# Patient Record
Sex: FEMALE | Race: ASIAN | NOT HISPANIC OR LATINO
[De-identification: names, ages, dates, MRNs, and addresses within clinical notes are randomized per-mention and may not be internally consistent; named-entity substitution may affect disease eponyms.]

---

## 2022-11-30 ENCOUNTER — NON-APPOINTMENT (OUTPATIENT)
Age: 32
End: 2022-11-30

## 2023-03-08 PROBLEM — Z00.00 ENCOUNTER FOR PREVENTIVE HEALTH EXAMINATION: Status: ACTIVE | Noted: 2023-03-08

## 2023-03-09 ENCOUNTER — APPOINTMENT (OUTPATIENT)
Dept: SPINE | Facility: CLINIC | Age: 33
End: 2023-03-09
Payer: COMMERCIAL

## 2023-03-09 ENCOUNTER — NON-APPOINTMENT (OUTPATIENT)
Age: 33
End: 2023-03-09

## 2023-03-09 VITALS
OXYGEN SATURATION: 96 % | WEIGHT: 105 LBS | HEART RATE: 66 BPM | HEIGHT: 61 IN | BODY MASS INDEX: 19.83 KG/M2 | SYSTOLIC BLOOD PRESSURE: 93 MMHG | DIASTOLIC BLOOD PRESSURE: 56 MMHG

## 2023-03-09 DIAGNOSIS — Z78.9 OTHER SPECIFIED HEALTH STATUS: ICD-10-CM

## 2023-03-09 DIAGNOSIS — Z87.728 PERSONAL HISTORY OF OTHER SPECIFIED (CORRECTED) CONGENITAL MALFORMATIONS OF NERVOUS SYSTEM AND SENSE ORGANS: ICD-10-CM

## 2023-03-09 DIAGNOSIS — Z80.0 FAMILY HISTORY OF MALIGNANT NEOPLASM OF DIGESTIVE ORGANS: ICD-10-CM

## 2023-03-09 PROCEDURE — 99203 OFFICE O/P NEW LOW 30 MIN: CPT

## 2023-03-09 NOTE — CONSULT LETTER
[Dear  ___] : Dear  [unfilled], [Courtesy Letter:] : I had the pleasure of seeing your patient, [unfilled], in my office today. [Please see my note below.] : Please see my note below. [Consult Closing:] : Thank you very much for allowing me to participate in the care of this patient.  If you have any questions, please do not hesitate to contact me. [Sincerely,] : Sincerely, [FreeTextEntry3] : Ladarius Peoples MD\par Chief of Neurosurgery [facility]\par [facility]\par

## 2023-03-09 NOTE — PHYSICAL EXAM
[Person] : oriented to person [Place] : oriented to place [Time] : oriented to time [Short Term Intact] : short term memory intact [Remote Intact] : remote memory intact [Span Intact] : the attention span was normal [Concentration Intact] : normal concentrating ability [Fluency] : fluency intact [Comprehension] : comprehension intact [Current Events] : adequate knowledge of current events [Past History] : adequate knowledge of personal past history [Vocabulary] : adequate range of vocabulary [Cranial Nerves Optic (II)] : visual acuity intact bilaterally,  pupils equal round and reactive to light [Cranial Nerves Oculomotor (III)] : extraocular motion intact [Cranial Nerves Trigeminal (V)] : facial sensation intact symmetrically [Cranial Nerves Facial (VII)] : face symmetrical [Cranial Nerves Vestibulocochlear (VIII)] : hearing was intact bilaterally [Cranial Nerves Glossopharyngeal (IX)] : tongue and palate midline [Cranial Nerves Accessory (XI - Cranial And Spinal)] : head turning and shoulder shrug symmetric [Cranial Nerves Hypoglossal (XII)] : there was no tongue deviation with protrusion [Motor Tone] : muscle tone was normal in all four extremities [Motor Strength] : muscle strength was normal in all four extremities [No Muscle Atrophy] : normal bulk in all four extremities [Sensation Tactile Decrease] : light touch was intact [Abnormal Walk] : normal gait [Balance] : balance was intact [2+] : Ankle jerk left 2+ [No Tenderness to Palpation] : no spine tenderness on palpation [Full ROM] : full ROM [No Pain with ROM] : no pain with motion in any direction [Able to toe walk] : the patient was able to toe walk [Able to heel walk] : the patient was able to heel walk [Past-pointing] : there was no past-pointing [Tremor] : no tremor present [Straight-Leg Raise Test - Left] : straight leg raise of the left leg was negative [Straight-Leg Raise Test - Right] : straight leg raise  of the right leg was negative [FreeTextEntry1] : Lumbar and lumbosacral scar well-healed.

## 2023-03-09 NOTE — HISTORY OF PRESENT ILLNESS
[> 3 months] : more  than 3 months [FreeTextEntry1] : Spina bifida occulta  [de-identified] : Ms. Cardoza is a 32 year old female who is 5 months pregnant with a history of chronic low back pain. The patient had an x-ray of lumbar spine done on 5/15/2021 which revealed spinal bifida occulta at L4 and L5. The patient the patient states she had lumbar surgery when she was born. The patient is here today to find out if she is a candidate to receive an epidural block during delivery. She is under the care of OBGYN Dr. John Smith. Denies any weakness, numbness or tingling of lower extremities, gait disturbance, bladder or bowel dysfunction.

## 2023-03-09 NOTE — REASON FOR VISIT
[New Patient Visit] : a new patient visit [Other: _____] : [unfilled] [FreeTextEntry1] : Spina bifida occulta

## 2023-03-09 NOTE — ASSESSMENT
[FreeTextEntry1] : 32 year old female with spina bifida occulta on x-ray and evidence on physical exam for repair of a lumbar sacral meningocele. MRI of lumbar spine without contrast ordered to evaluate spinal canal. The patient will return after obtaining image. \par \par I, Dr. Ladarius Peoples, evaluated this patient with Nurse Practitioner, Vianey Gayle, and established the plan of care. I personally discussed this patient during the key portions of the history and exam with the Nurse Practitioner at the time of the visit. I agree with the assessment and plan as written, unless noted below.

## 2023-03-15 ENCOUNTER — APPOINTMENT (OUTPATIENT)
Dept: MRI IMAGING | Facility: CLINIC | Age: 33
End: 2023-03-15
Payer: COMMERCIAL

## 2023-03-15 ENCOUNTER — TRANSCRIPTION ENCOUNTER (OUTPATIENT)
Age: 33
End: 2023-03-15

## 2023-03-15 ENCOUNTER — OUTPATIENT (OUTPATIENT)
Dept: OUTPATIENT SERVICES | Facility: HOSPITAL | Age: 33
LOS: 1 days | End: 2023-03-15
Payer: COMMERCIAL

## 2023-03-15 DIAGNOSIS — Q76.0 SPINA BIFIDA OCCULTA: ICD-10-CM

## 2023-03-15 PROCEDURE — 72148 MRI LUMBAR SPINE W/O DYE: CPT | Mod: 26

## 2023-03-15 PROCEDURE — 72148 MRI LUMBAR SPINE W/O DYE: CPT

## 2023-03-16 ENCOUNTER — APPOINTMENT (OUTPATIENT)
Dept: SPINE | Facility: CLINIC | Age: 33
End: 2023-03-16
Payer: COMMERCIAL

## 2023-03-16 VITALS
HEART RATE: 76 BPM | HEIGHT: 61 IN | BODY MASS INDEX: 19.83 KG/M2 | WEIGHT: 105 LBS | DIASTOLIC BLOOD PRESSURE: 73 MMHG | OXYGEN SATURATION: 95 % | SYSTOLIC BLOOD PRESSURE: 107 MMHG

## 2023-03-16 DIAGNOSIS — Q06.2: ICD-10-CM

## 2023-03-16 DIAGNOSIS — Q76.0 SPINA BIFIDA OCCULTA: ICD-10-CM

## 2023-03-16 DIAGNOSIS — Q06.8 OTHER SPECIFIED CONGENITAL MALFORMATIONS OF SPINAL CORD: ICD-10-CM

## 2023-03-16 DIAGNOSIS — Q05.9 SPINA BIFIDA, UNSPECIFIED: ICD-10-CM

## 2023-03-16 PROCEDURE — 99212 OFFICE O/P EST SF 10 MIN: CPT

## 2023-03-18 NOTE — ASSESSMENT
[FreeTextEntry1] : 32 year old female 5 months pregnant with status postrepair of  myelomeningocele.  She also has diastematomyelia and a tethered spinal cord.  Her open neural tube at birth and subsequent surgery likely has resulted in significant scarring that would make epidural anesthesia potentially problematic. The patient is not a candidate to receive an epidural during delivery. It was discussed with the patient she should avoid any spinal taps. Follow up as needed

## 2023-03-18 NOTE — REASON FOR VISIT
[Follow-Up: _____] : a [unfilled] follow-up visit [Spouse] : spouse [FreeTextEntry1] : Ms. Cardoza is a 32 year old female who is 5 months pregnant with a history of spinal bifida occulta at L4 and L5 and chronic low back pain.  On physical examination during initial visit on 3/9/2023 there is evidence of repair of a lumbar sacral meningocele. The patient was referred by her OBGYN Dr. John Smith to determine if she is a candidate to receive an epidural block during delivery.  Denies any weakness, numbness or tingling of lower extremities, gait disturbance, bladder or bowel dysfunction. \par \par MRI of lumbar spine reviewed today shows diastematomyelia along with a tethered spinal cord.  There is spinal cord down well into the lower lumbar area.  The spinal cord should normally terminate at the L1-2 level.\par

## 2023-07-27 ENCOUNTER — TRANSCRIPTION ENCOUNTER (OUTPATIENT)
Age: 33
End: 2023-07-27

## 2023-07-27 ENCOUNTER — INPATIENT (INPATIENT)
Facility: HOSPITAL | Age: 33
LOS: 3 days | Discharge: HOME | End: 2023-07-31
Attending: OBSTETRICS & GYNECOLOGY | Admitting: OBSTETRICS & GYNECOLOGY
Payer: COMMERCIAL

## 2023-07-27 VITALS — OXYGEN SATURATION: 98 % | HEART RATE: 64 BPM

## 2023-07-27 DIAGNOSIS — Z34.80 ENCOUNTER FOR SUPERVISION OF OTHER NORMAL PREGNANCY, UNSPECIFIED TRIMESTER: ICD-10-CM

## 2023-07-27 DIAGNOSIS — Z3A.40 40 WEEKS GESTATION OF PREGNANCY: ICD-10-CM

## 2023-07-27 DIAGNOSIS — O26.899 OTHER SPECIFIED PREGNANCY RELATED CONDITIONS, UNSPECIFIED TRIMESTER: ICD-10-CM

## 2023-07-27 LAB
BASOPHILS # BLD AUTO: 0.03 K/UL — SIGNIFICANT CHANGE UP (ref 0–0.2)
BASOPHILS NFR BLD AUTO: 0.3 % — SIGNIFICANT CHANGE UP (ref 0–2)
BLD GP AB SCN SERPL QL: NEGATIVE — SIGNIFICANT CHANGE UP
EOSINOPHIL # BLD AUTO: 0.08 K/UL — SIGNIFICANT CHANGE UP (ref 0–0.5)
EOSINOPHIL NFR BLD AUTO: 0.7 % — SIGNIFICANT CHANGE UP (ref 0–6)
HCT VFR BLD CALC: 32.8 % — LOW (ref 34.5–45)
HGB BLD-MCNC: 10.6 G/DL — LOW (ref 11.5–15.5)
IMM GRANULOCYTES NFR BLD AUTO: 0.5 % — SIGNIFICANT CHANGE UP (ref 0–0.9)
LYMPHOCYTES # BLD AUTO: 2.74 K/UL — SIGNIFICANT CHANGE UP (ref 1–3.3)
LYMPHOCYTES # BLD AUTO: 23.3 % — SIGNIFICANT CHANGE UP (ref 13–44)
MCHC RBC-ENTMCNC: 27.3 PG — SIGNIFICANT CHANGE UP (ref 27–34)
MCHC RBC-ENTMCNC: 32.3 GM/DL — SIGNIFICANT CHANGE UP (ref 32–36)
MCV RBC AUTO: 84.5 FL — SIGNIFICANT CHANGE UP (ref 80–100)
MONOCYTES # BLD AUTO: 0.86 K/UL — SIGNIFICANT CHANGE UP (ref 0–0.9)
MONOCYTES NFR BLD AUTO: 7.3 % — SIGNIFICANT CHANGE UP (ref 2–14)
NEUTROPHILS # BLD AUTO: 7.97 K/UL — HIGH (ref 1.8–7.4)
NEUTROPHILS NFR BLD AUTO: 67.9 % — SIGNIFICANT CHANGE UP (ref 43–77)
NRBC # BLD: 0 /100 WBCS — SIGNIFICANT CHANGE UP (ref 0–0)
PLATELET # BLD AUTO: 161 K/UL — SIGNIFICANT CHANGE UP (ref 150–400)
RBC # BLD: 3.88 M/UL — SIGNIFICANT CHANGE UP (ref 3.8–5.2)
RBC # FLD: 14.2 % — SIGNIFICANT CHANGE UP (ref 10.3–14.5)
RH IG SCN BLD-IMP: POSITIVE — SIGNIFICANT CHANGE UP
RH IG SCN BLD-IMP: POSITIVE — SIGNIFICANT CHANGE UP
WBC # BLD: 11.74 K/UL — HIGH (ref 3.8–10.5)
WBC # FLD AUTO: 11.74 K/UL — HIGH (ref 3.8–10.5)

## 2023-07-27 RX ORDER — CHLORHEXIDINE GLUCONATE 213 G/1000ML
1 SOLUTION TOPICAL DAILY
Refills: 0 | Status: DISCONTINUED | OUTPATIENT
Start: 2023-07-27 | End: 2023-07-28

## 2023-07-27 RX ORDER — OXYTOCIN 10 UNIT/ML
333.33 VIAL (ML) INJECTION
Qty: 20 | Refills: 0 | Status: DISCONTINUED | OUTPATIENT
Start: 2023-07-27 | End: 2023-07-31

## 2023-07-27 RX ORDER — CITRIC ACID/SODIUM CITRATE 300-500 MG
15 SOLUTION, ORAL ORAL EVERY 6 HOURS
Refills: 0 | Status: DISCONTINUED | OUTPATIENT
Start: 2023-07-27 | End: 2023-07-28

## 2023-07-27 RX ORDER — SODIUM CHLORIDE 9 MG/ML
1000 INJECTION, SOLUTION INTRAVENOUS
Refills: 0 | Status: DISCONTINUED | OUTPATIENT
Start: 2023-07-27 | End: 2023-07-28

## 2023-07-27 NOTE — OB RN PATIENT PROFILE - HEALTH/HEALTHCARE ANXIETIES, PROFILE
Father states mother wants to take patient home to attempt to collect urine because pt is fussy. Explained that urine collection must be done here. Reviewed the risks of patient having a undiagnosed urinary tract infection to include kidney damage and even possibly death. Father states he is ok with a cath urine collection but mother is still saying no. Explained informed refusal form will need to be signed if unable to collect urine during visit. Father verbalized understanding. Updated Dr. Emi Salinas on conversations, states he will go back and speak to mother and father again. denies

## 2023-07-27 NOTE — OB RN PATIENT PROFILE - TEACHING/LEARNING FACTORS IMPACT ABILITY TO LEARN
S:Patient presents due to  edema and fetal arrhythmia heard in clinic by Dr. Smallwood.    B:38w6d   Allergies: Augmentin and Avocado  A:              Admission on 2018   Component Date Value     ALT 2018 9      AST 2018 14      Sodium 2018 139      Potassium 2018 3.8      Chloride 2018 106      Carbon Dioxide 2018 24      Anion Gap 2018 9      Glucose 2018 95      Urea Nitrogen 2018 8      Creatinine 2018 0.69      GFR Estimate 2018 >90      GFR Estimate If Black 2018 >90      Calcium 2018 8.4*     WBC 2018 7.4      RBC Count 2018 3.60*     Hemoglobin 2018 11.1*     Hematocrit 2018 33.5*     MCV 2018 93      MCH 2018 30.8      MCHC 2018 33.1      RDW 2018 12.1      Platelet Count 2018 156      Dr. JESUS Smallwood and was informed to patient status. and orders received.  Plan includes; Labs  and NST. Reviewed with patient and she agrees with plan.   Bill of Rights given & questions discussed?: Yes  HC Your Rights handout : Informed and given    Prenatal Breastfeeding Education Toolkit provided for patient to review,helping her to make an informed decision on a feeding choice for her baby. Patient declined. Questions answered.    Oriented to room and call light.            none

## 2023-07-27 NOTE — OB RN PATIENT PROFILE - PATIENT'S PREFERRED PRONOUN
Jaron Reed,    This note is let you know that all of your lab test results look great.  Let me know if you have any questions.    Lisa TILLMAN CNP  
Her/She

## 2023-07-27 NOTE — OB RN PATIENT PROFILE - FUNCTIONAL ASSESSMENT - DAILY ACTIVITY SCORE HIDDEN
No new care gaps identified.  [facility] Embedded Care Gaps. Reference number: [id number]. 2/17/2023   1:32:24 PM CST   24

## 2023-07-27 NOTE — OB PROVIDER H&P - ASSESSMENT
A/P: 32yoF  @40.5 weeks gestation (MIRANDA 23) admitted for labor. Pt was consulted by anesthesia 2/2 h/o spinal bifida.   - Admit to L&D  - Routine Labs. IVF.   - EFM/[place]: continuous monitoring   - Expectant management   - GBS negative   - Elevated PPH risk 2/2   - Anesthesia consulted prior to delivery -> *pt medically denied clearance for epidural/spinal pain mgmt 2/2 h/o spinal bifida. Anesthesia made aware of pt's admission*   - D/w CRISTHIAN MylesC

## 2023-07-27 NOTE — OB PROVIDER H&P - HISTORY OF PRESENT ILLNESS
OB PA Admission H&P    32yoF  @40.5 weeks gestation (MIRANDA 23) presents for ROL. Pt c/o episode of vaginal bleeding this afternoon along with painful contractions occurring regularly every 2-4 minutes, c/o increase pressure like pain. Denies any leakage of fluid. Endorses +FM. PNC uncomplicated. GBS negative. Pt denies any other concerns. Pt last ate 4pm.     Pt with significant PMHx of spinal bifida occulta s/p repair of  myelomeningocele. Per NeuroSx note- pt also has diastematomyelia and a tethered spinal cord. Her open neural tube at birth and subsequent surgery likely has resulted in significant scarring that would make epidural anesthesia potentially problematic. The patient is not a candidate to receive an epidural during delivery. It was discussed with the patient she should avoid any spinal taps.   Anesthesia made aware of patient's admission.      – PNC: GBS(-). EFW 3500g.   – OBHx: primigravida   – GynHx: denies h/o fibroids, ovarian cysts, abnl pap smears, STDs  – PMH: h/o spinal bifida occulta at L4 and L5 and chronic low back pain (see above neurosx note); denies h/o HTN, DM, asthma, thyroid disorders, bleeding disorders, h/o blood transfusions   – PSH: h/o spinal sx   – Psych: denies anxiety/depression   – Social: denies alcohol/tobacco/drug use in pregnancy   – Meds: PNV   – Allergies: NKDA  – Will accept blood transfusions: Yes    Vital Signs Last 24 Hrs  T(C): 36.7 (2023 18:35), Max: 36.7 (2023 18:18)  T(F): 98.1 (2023 18:35), Max: 98.1 (2023 18:26)  HR: 77 (2023 19:27) (59 - 77)  BP: 113/59 (2023 18:35) (113/59 - 113/59)  RR: 18 (2023 18:35) (18 - 18)  SpO2: 98% (2023 19:27) (87% - 99%)    Gen: NAD  CV: RRR  Lungs: CTA b/l   Abd: gravid, non-tender  Ext: BLE non-edematous, no calf tenderness b/l     – VE:   – FHT: baseline 150, mod variability, +accels, -decels  – Erwin: q2-3min   – EFW: 3500g   – Sono: vertex

## 2023-07-27 NOTE — OB PROVIDER H&P - ATTENDING COMMENTS
P0 presented in early labor.   - PMHx significant for spina bifida. Pt had anesthesia consult and can NOT receive epidural   - will expectantly manage labor.   - 5.5/90/0

## 2023-07-27 NOTE — OB PROVIDER H&P - NSLOWPPHRISK_OBGYN_A_OB
No previous uterine incision/Vigil Pregnancy/No known bleeding disorder/No other PPH risks indicated

## 2023-07-27 NOTE — OB RN PATIENT PROFILE - FALL HARM RISK - UNIVERSAL INTERVENTIONS
Bed in lowest position, wheels locked, appropriate side rails in place/Call bell, personal items and telephone in reach/Instruct patient to call for assistance before getting out of bed or chair/Non-slip footwear when patient is out of bed/[place] to call system/Physically safe environment - no spills, clutter or unnecessary equipment/Purposeful Proactive Rounding/Room/bathroom lighting operational, light cord in reach

## 2023-07-27 NOTE — OB RN PATIENT PROFILE - PATIENT ON (OXYGEN DELIVERY METHOD)
Pt arrived to ED via wheelchair for the cath lab. Pt states he had his dialysis cath replaced this morning and he was sent the ED for dialysis due to SOB and high potassium.
stand-by assist
room air

## 2023-07-27 NOTE — OB PROVIDER H&P - LABOR: BISHOP SCORE
Spoke with patients son, Adam, and informed him of the below results and recommendations. He verbalized understanding.         11

## 2023-07-28 LAB — T PALLIDUM AB TITR SER: NEGATIVE — SIGNIFICANT CHANGE UP

## 2023-07-28 PROCEDURE — 59514 CESAREAN DELIVERY ONLY: CPT | Mod: AS,U9

## 2023-07-28 DEVICE — SURGICEL FIBRILLAR 2 X 4": Type: IMPLANTABLE DEVICE | Status: FUNCTIONAL

## 2023-07-28 RX ORDER — KETOROLAC TROMETHAMINE 30 MG/ML
30 SYRINGE (ML) INJECTION EVERY 6 HOURS
Refills: 0 | Status: DISCONTINUED | OUTPATIENT
Start: 2023-07-28 | End: 2023-07-30

## 2023-07-28 RX ORDER — DIPHENOXYLATE HCL/ATROPINE 2.5-.025MG
1 TABLET ORAL ONCE
Refills: 0 | Status: DISCONTINUED | OUTPATIENT
Start: 2023-07-28 | End: 2023-07-31

## 2023-07-28 RX ORDER — OXYTOCIN 10 UNIT/ML
2 VIAL (ML) INJECTION
Qty: 30 | Refills: 0 | Status: DISCONTINUED | OUTPATIENT
Start: 2023-07-28 | End: 2023-07-31

## 2023-07-28 RX ORDER — HEPARIN SODIUM 5000 [USP'U]/ML
5000 INJECTION INTRAVENOUS; SUBCUTANEOUS EVERY 12 HOURS
Refills: 0 | Status: DISCONTINUED | OUTPATIENT
Start: 2023-07-28 | End: 2023-07-31

## 2023-07-28 RX ORDER — NALOXONE HYDROCHLORIDE 4 MG/.1ML
0.1 SPRAY NASAL
Refills: 0 | Status: DISCONTINUED | OUTPATIENT
Start: 2023-07-28 | End: 2023-07-29

## 2023-07-28 RX ORDER — DIPHENHYDRAMINE HCL 50 MG
25 CAPSULE ORAL EVERY 6 HOURS
Refills: 0 | Status: DISCONTINUED | OUTPATIENT
Start: 2023-07-28 | End: 2023-07-31

## 2023-07-28 RX ORDER — HYDROMORPHONE HYDROCHLORIDE 2 MG/ML
0.5 INJECTION INTRAMUSCULAR; INTRAVENOUS; SUBCUTANEOUS
Refills: 0 | Status: DISCONTINUED | OUTPATIENT
Start: 2023-07-28 | End: 2023-07-29

## 2023-07-28 RX ORDER — MAGNESIUM HYDROXIDE 400 MG/1
30 TABLET, CHEWABLE ORAL
Refills: 0 | Status: DISCONTINUED | OUTPATIENT
Start: 2023-07-28 | End: 2023-07-31

## 2023-07-28 RX ORDER — DIPHENOXYLATE HCL/ATROPINE 2.5-.025MG
2 TABLET ORAL ONCE
Refills: 0 | Status: DISCONTINUED | OUTPATIENT
Start: 2023-07-28 | End: 2023-07-28

## 2023-07-28 RX ORDER — OXYCODONE HYDROCHLORIDE 5 MG/1
5 TABLET ORAL
Refills: 0 | Status: COMPLETED | OUTPATIENT
Start: 2023-07-28 | End: 2023-08-04

## 2023-07-28 RX ORDER — NALBUPHINE HYDROCHLORIDE 10 MG/ML
2.5 INJECTION, SOLUTION INTRAMUSCULAR; INTRAVENOUS; SUBCUTANEOUS EVERY 6 HOURS
Refills: 0 | Status: DISCONTINUED | OUTPATIENT
Start: 2023-07-28 | End: 2023-07-29

## 2023-07-28 RX ORDER — LANOLIN
1 OINTMENT (GRAM) TOPICAL EVERY 6 HOURS
Refills: 0 | Status: DISCONTINUED | OUTPATIENT
Start: 2023-07-28 | End: 2023-07-31

## 2023-07-28 RX ORDER — ACETAMINOPHEN 500 MG
975 TABLET ORAL
Refills: 0 | Status: DISCONTINUED | OUTPATIENT
Start: 2023-07-28 | End: 2023-07-31

## 2023-07-28 RX ORDER — TETANUS TOXOID, REDUCED DIPHTHERIA TOXOID AND ACELLULAR PERTUSSIS VACCINE, ADSORBED 5; 2.5; 8; 8; 2.5 [IU]/.5ML; [IU]/.5ML; UG/.5ML; UG/.5ML; UG/.5ML
0.5 SUSPENSION INTRAMUSCULAR ONCE
Refills: 0 | Status: DISCONTINUED | OUTPATIENT
Start: 2023-07-28 | End: 2023-07-31

## 2023-07-28 RX ORDER — SIMETHICONE 80 MG/1
80 TABLET, CHEWABLE ORAL EVERY 4 HOURS
Refills: 0 | Status: DISCONTINUED | OUTPATIENT
Start: 2023-07-28 | End: 2023-07-31

## 2023-07-28 RX ORDER — BUTORPHANOL TARTRATE 2 MG/ML
0.12 INJECTION, SOLUTION INTRAMUSCULAR; INTRAVENOUS EVERY 6 HOURS
Refills: 0 | Status: DISCONTINUED | OUTPATIENT
Start: 2023-07-28 | End: 2023-07-29

## 2023-07-28 RX ORDER — OXYTOCIN 10 UNIT/ML
333.33 VIAL (ML) INJECTION
Qty: 20 | Refills: 0 | Status: DISCONTINUED | OUTPATIENT
Start: 2023-07-28 | End: 2023-07-31

## 2023-07-28 RX ORDER — IBUPROFEN 200 MG
600 TABLET ORAL EVERY 6 HOURS
Refills: 0 | Status: COMPLETED | OUTPATIENT
Start: 2023-07-28 | End: 2024-06-25

## 2023-07-28 RX ORDER — SODIUM CHLORIDE 9 MG/ML
1000 INJECTION, SOLUTION INTRAVENOUS
Refills: 0 | Status: DISCONTINUED | OUTPATIENT
Start: 2023-07-28 | End: 2023-07-31

## 2023-07-28 RX ORDER — ONDANSETRON 8 MG/1
4 TABLET, FILM COATED ORAL EVERY 6 HOURS
Refills: 0 | Status: DISCONTINUED | OUTPATIENT
Start: 2023-07-28 | End: 2023-07-29

## 2023-07-28 RX ORDER — OXYCODONE HYDROCHLORIDE 5 MG/1
5 TABLET ORAL ONCE
Refills: 0 | Status: DISCONTINUED | OUTPATIENT
Start: 2023-07-28 | End: 2023-07-31

## 2023-07-28 RX ORDER — HYDROMORPHONE HYDROCHLORIDE 2 MG/ML
30 INJECTION INTRAMUSCULAR; INTRAVENOUS; SUBCUTANEOUS
Refills: 0 | Status: DISCONTINUED | OUTPATIENT
Start: 2023-07-28 | End: 2023-07-29

## 2023-07-28 RX ORDER — REMIFENTANIL HYDROCHLORIDE 5 MG/5ML
0.03 INJECTION, POWDER, LYOPHILIZED, FOR SOLUTION INTRAVENOUS
Qty: 10 | Refills: 0 | Status: DISCONTINUED | OUTPATIENT
Start: 2023-07-28 | End: 2023-07-31

## 2023-07-28 RX ADMIN — Medication 0.25 MILLIGRAM(S): at 06:02

## 2023-07-28 RX ADMIN — Medication 30 MILLIGRAM(S): at 18:30

## 2023-07-28 RX ADMIN — Medication 2 TABLET(S): at 13:03

## 2023-07-28 RX ADMIN — SODIUM CHLORIDE 125 MILLILITER(S): 9 INJECTION, SOLUTION INTRAVENOUS at 07:26

## 2023-07-28 RX ADMIN — Medication 2 MILLIUNIT(S)/MIN: at 06:30

## 2023-07-28 RX ADMIN — Medication 975 MILLIGRAM(S): at 22:44

## 2023-07-28 RX ADMIN — HYDROMORPHONE HYDROCHLORIDE 30 MILLILITER(S): 2 INJECTION INTRAMUSCULAR; INTRAVENOUS; SUBCUTANEOUS at 13:14

## 2023-07-28 RX ADMIN — Medication 975 MILLIGRAM(S): at 15:36

## 2023-07-28 RX ADMIN — Medication 30 MILLIGRAM(S): at 23:48

## 2023-07-28 RX ADMIN — Medication 30 MILLIGRAM(S): at 18:00

## 2023-07-28 RX ADMIN — Medication 975 MILLIGRAM(S): at 21:38

## 2023-07-28 RX ADMIN — HEPARIN SODIUM 5000 UNIT(S): 5000 INJECTION INTRAVENOUS; SUBCUTANEOUS at 21:38

## 2023-07-28 NOTE — OB PROVIDER LABOR PROGRESS NOTE - NS_SUBJECTIVE/OBJECTIVE_OBGYN_ALL_OB_FT
Labor & Delivery Progress Note     Pt examined @ 0600 due to prolonged deceleration of 4min in the setting of a prolonged contraction     T(C): 36.7 (07-27-23 @ 23:20), Max: 36.8 (07-27-23 @ 21:43)  HR: 63 (07-28-23 @ 06:10) (52 - 79)  BP: 116/56 (07-28-23 @ 06:10) (113/59 - 127/66)  RR: 14 (07-28-23 @ 06:00) (14 - 18)  SpO2: 99% (07-28-23 @ 06:08) (87% - 100%)

## 2023-07-28 NOTE — OB RN INTRAOPERATIVE NOTE - NS_URGENCYSURGERY_OBGYN_ALL_OB
Called patient and advised him of the message. He stated that he will call back to schedule an appointment.    Susana Worrell MA      Non-scheduled Non Urgent

## 2023-07-28 NOTE — CHART NOTE - NSCHARTNOTEFT_GEN_A_CORE
Pt re-examined. 8/100/0. AROM scantly bloody fluid. Pt in significant discomfort. All options of pain control discussed again. Continues to decline all pain intervention.  - Due to irregular contraction pattern, will consider low dose pitocin to speed along labor.

## 2023-07-28 NOTE — OB PROVIDER LABOR PROGRESS NOTE - ASSESSMENT
#Labor   - Given prolonged contraction, decision was made to administer Terbutaline  - Additionally, patient noted to have been recently given Fransisco/Fent at approximately 0540 (pt unable to get epi or a spinal given hx of spina bifida)  - Patient not started on Oxytocin at this time     Nicolas Bang, PGY-2    d/w Dr. PANCHO Smith 
A/P: 32yoF  @40.5 weeks gestation (MIRANDA 23) admitted for labor. Pt consulted by anesthesia 2/2 h/o spinal bifida.   - Expt mgmt    - EFM/[place]: continuous monitoring   - GBS negative   - Anesthesia consulted   - Resuscitative measures prn   - Peanut ball    - D/w Dr. Luis Unger, PA-C

## 2023-07-28 NOTE — CHART NOTE - NSCHARTNOTEFT_GEN_A_CORE
Pt fully dilated and pushing. No descent of vertex past pubis. Presenting now as asyclitic OT.   - all considerations discussed with patient and . As there is no descent, pt in significant pain. Would rec proceed with  delivery rather that continued pusing.

## 2023-07-28 NOTE — OB PROVIDER DELIVERY SUMMARY - NSLOWPPHRISK_OBGYN_A_OB
No previous uterine incision/Vigil Pregnancy/Less than or equal to 4 previous vaginal births/No known bleeding disorder/No history of postpartum hemorrhage/No other PPH risks indicated

## 2023-07-28 NOTE — OB NEONATOLOGY/PEDIATRICIAN DELIVERY SUMMARY - NSPEDSNEONOTESA_OBGYN_ALL_OB_FT
Jerry requested to attend primary  under general anesthesia by OB. Infant is a 40.6 week M born to a 33 yo A+, PNL negative and immune, GBS negative mother. AROM, bloody,  at 04:04, about 7 hrs ptd. Pregnancy uncomplicated. Maternal history significant for spina bifida, unable to receive epidural. Infant born vigorous with spontaneous cry. Routine resuscitation. Apgars 9/9. PE unremarkable. Transfer to [facility] for routine care under management of PMD. EOS 0.1.

## 2023-07-28 NOTE — OB RN DELIVERY SUMMARY - NSSELHIDDEN_OBGYN_ALL_OB_FT
[NS_DeliveryAttending1_OBGYN_ALL_OB_FT:[id number]==],[NS_DeliveryAssist1_OBGYN_ALL_OB_FT:[id number]==],[NS_DeliveryRN_OBGYN_ALL_OB_FT:[id number]==]

## 2023-07-28 NOTE — OB PROVIDER LABOR PROGRESS NOTE - NS_SUBJECTIVE/OBJECTIVE_OBGYN_ALL_OB_FT
Pt seen and examined at bedside for cervical change.  Pt appears more uncomfortable and c/o increase pelvic pressure.   VE: 7/90/1, bloody show     Vital Signs Last 24 Hrs  T(C): 36.7 (27 Jul 2023 23:20), Max: 36.8 (27 Jul 2023 21:43)  T(F): 98.06 (27 Jul 2023 23:20), Max: 98.24 (27 Jul 2023 21:43)  HR: 66 (28 Jul 2023 00:08) (55 - 77)  BP: 118/63 (27 Jul 2023 23:20) (113/59 - 118/63)  RR: 17 (27 Jul 2023 21:43) (17 - 18)  SpO2: 98% (28 Jul 2023 00:08) (87% - 100%)

## 2023-07-28 NOTE — OB PROVIDER DELIVERY SUMMARY - NSPROVIDERDELIVERYNOTE_OBGYN_ALL_OB_FT
PLTCS performed for arrest of descent and cephalopelvic disproportion. Pt underwent general anesthesia due to history of spina bifida. Atraumatic delivery of viable male infant in LOT position w/ apgars 9/9 in clear amniotic fluid. Manual removal of intact placenta. Uterus boggy, exteriorized and closed with PDS suture. IM methergine, Hemabate and Pitocin administered. Tone slightly improved. Rectal cytotec administered and with continued massage, tone improved. Excellent hemostasis of hysterotomy. Grossly normal uterus, tubes and ovaries b/l. Reyes was draining imelda blood at start of case, clear and slightly pink tinged at conclusion.   QBL 720cc  UO 250cc  IVF 1500cc.     Dict#: [id number]

## 2023-07-28 NOTE — CHART NOTE - NSCHARTNOTEFT_GEN_A_CORE
Pt has made slow and inadequate change through the night. Has had mild irregular contraction pattern despite being in significant pain.   - all considerations discussed at length with  and patient. Pt wishes to exhaust all measures to have a vaginal delivery.   - Will augment with pitocin and observe.

## 2023-07-28 NOTE — OB RN INTRAOPERATIVE NOTE - NSSELHIDDEN_OBGYN_ALL_OB_FT
[NS_DeliveryAttending1_OBGYN_ALL_OB_FT:[id number]==],[NS_DeliveryAssist1_OBGYN_ALL_OB_FT:[id number]==],[NS_DeliveryRN_OBGYN_ALL_OB_FT:[id number]]

## 2023-07-28 NOTE — OB PROVIDER DELIVERY SUMMARY - NSSELHIDDEN_OBGYN_ALL_OB_FT
[NS_DeliveryAttending1_OBGYN_ALL_OB_FT:[id number]==],[NS_DeliveryAssist1_OBGYN_ALL_OB_FT:[id number]==],[NS_DeliveryRN_OBGYN_ALL_OB_FT:[id number]==]

## 2023-07-29 LAB
BASOPHILS # BLD AUTO: 0 K/UL — SIGNIFICANT CHANGE UP (ref 0–0.2)
BASOPHILS NFR BLD AUTO: 0 % — SIGNIFICANT CHANGE UP (ref 0–2)
EOSINOPHIL # BLD AUTO: 0 K/UL — SIGNIFICANT CHANGE UP (ref 0–0.5)
EOSINOPHIL NFR BLD AUTO: 0 % — SIGNIFICANT CHANGE UP (ref 0–6)
HCT VFR BLD CALC: 20 % — CRITICAL LOW (ref 34.5–45)
HCT VFR BLD CALC: 24.1 % — LOW (ref 34.5–45)
HGB BLD-MCNC: 6.6 G/DL — CRITICAL LOW (ref 11.5–15.5)
HGB BLD-MCNC: 7.8 G/DL — LOW (ref 11.5–15.5)
LYMPHOCYTES # BLD AUTO: 1.65 K/UL — SIGNIFICANT CHANGE UP (ref 1–3.3)
LYMPHOCYTES # BLD AUTO: 10.4 % — LOW (ref 13–44)
MANUAL SMEAR VERIFICATION: SIGNIFICANT CHANGE UP
MCHC RBC-ENTMCNC: 27.4 PG — SIGNIFICANT CHANGE UP (ref 27–34)
MCHC RBC-ENTMCNC: 27.7 PG — SIGNIFICANT CHANGE UP (ref 27–34)
MCHC RBC-ENTMCNC: 32.4 GM/DL — SIGNIFICANT CHANGE UP (ref 32–36)
MCHC RBC-ENTMCNC: 33 GM/DL — SIGNIFICANT CHANGE UP (ref 32–36)
MCV RBC AUTO: 84 FL — SIGNIFICANT CHANGE UP (ref 80–100)
MCV RBC AUTO: 84.6 FL — SIGNIFICANT CHANGE UP (ref 80–100)
MONOCYTES # BLD AUTO: 0.97 K/UL — HIGH (ref 0–0.9)
MONOCYTES NFR BLD AUTO: 6.1 % — SIGNIFICANT CHANGE UP (ref 2–14)
NEUTROPHILS # BLD AUTO: 13.24 K/UL — HIGH (ref 1.8–7.4)
NEUTROPHILS NFR BLD AUTO: 82.6 % — HIGH (ref 43–77)
NEUTS BAND # BLD: 0.9 % — SIGNIFICANT CHANGE UP (ref 0–8)
NRBC # BLD: 0 /100 WBCS — SIGNIFICANT CHANGE UP (ref 0–0)
PLAT MORPH BLD: NORMAL — SIGNIFICANT CHANGE UP
PLATELET # BLD AUTO: 121 K/UL — LOW (ref 150–400)
PLATELET # BLD AUTO: 153 K/UL — SIGNIFICANT CHANGE UP (ref 150–400)
RBC # BLD: 2.38 M/UL — LOW (ref 3.8–5.2)
RBC # BLD: 2.85 M/UL — LOW (ref 3.8–5.2)
RBC # FLD: 14.6 % — HIGH (ref 10.3–14.5)
RBC # FLD: 14.6 % — HIGH (ref 10.3–14.5)
RBC BLD AUTO: SIGNIFICANT CHANGE UP
WBC # BLD: 15.86 K/UL — HIGH (ref 3.8–10.5)
WBC # BLD: 22.15 K/UL — HIGH (ref 3.8–10.5)
WBC # FLD AUTO: 15.86 K/UL — HIGH (ref 3.8–10.5)
WBC # FLD AUTO: 22.15 K/UL — HIGH (ref 3.8–10.5)

## 2023-07-29 RX ORDER — OXYCODONE HYDROCHLORIDE 5 MG/1
5 TABLET ORAL
Refills: 0 | Status: DISCONTINUED | OUTPATIENT
Start: 2023-07-29 | End: 2023-07-31

## 2023-07-29 RX ORDER — IBUPROFEN 200 MG
600 TABLET ORAL EVERY 6 HOURS
Refills: 0 | Status: DISCONTINUED | OUTPATIENT
Start: 2023-07-29 | End: 2023-07-31

## 2023-07-29 RX ADMIN — Medication 30 MILLIGRAM(S): at 12:47

## 2023-07-29 RX ADMIN — Medication 30 MILLIGRAM(S): at 05:20

## 2023-07-29 RX ADMIN — Medication 975 MILLIGRAM(S): at 04:13

## 2023-07-29 RX ADMIN — Medication 30 MILLIGRAM(S): at 23:30

## 2023-07-29 RX ADMIN — Medication 975 MILLIGRAM(S): at 21:15

## 2023-07-29 RX ADMIN — Medication 975 MILLIGRAM(S): at 10:40

## 2023-07-29 RX ADMIN — Medication 975 MILLIGRAM(S): at 16:06

## 2023-07-29 RX ADMIN — HEPARIN SODIUM 5000 UNIT(S): 5000 INJECTION INTRAVENOUS; SUBCUTANEOUS at 09:49

## 2023-07-29 RX ADMIN — Medication 30 MILLIGRAM(S): at 13:15

## 2023-07-29 RX ADMIN — Medication 30 MILLIGRAM(S): at 18:54

## 2023-07-29 RX ADMIN — Medication 975 MILLIGRAM(S): at 17:00

## 2023-07-29 RX ADMIN — HYDROMORPHONE HYDROCHLORIDE 30 MILLILITER(S): 2 INJECTION INTRAMUSCULAR; INTRAVENOUS; SUBCUTANEOUS at 07:15

## 2023-07-29 RX ADMIN — HEPARIN SODIUM 5000 UNIT(S): 5000 INJECTION INTRAVENOUS; SUBCUTANEOUS at 21:15

## 2023-07-29 RX ADMIN — SODIUM CHLORIDE 125 MILLILITER(S): 9 INJECTION, SOLUTION INTRAVENOUS at 09:47

## 2023-07-29 RX ADMIN — Medication 30 MILLIGRAM(S): at 06:13

## 2023-07-29 RX ADMIN — Medication 975 MILLIGRAM(S): at 22:15

## 2023-07-29 RX ADMIN — Medication 30 MILLIGRAM(S): at 00:01

## 2023-07-29 RX ADMIN — Medication 975 MILLIGRAM(S): at 09:47

## 2023-07-29 RX ADMIN — Medication 975 MILLIGRAM(S): at 03:29

## 2023-07-29 NOTE — PROVIDER CONTACT NOTE (OTHER) - BACKGROUND
Eric d/c'd on previous shift, DTV [id number] hrs.  Attempted to void, requested more time to void on own.

## 2023-07-29 NOTE — PROGRESS NOTE ADULT - SUBJECTIVE AND OBJECTIVE BOX
Day 1 of Anesthesia Pain Management Service    SUBJECTIVE: Patient is doing well with IV PCA    Pain Scale Score:	[X] Refer to charted pain scores    THERAPY:    [ ] IV PCA Morphine		[ ] 5 mg/mL	[ ] 1 mg/mL  [X] IV PCA Hydromorphone	[ ] 5 mg/mL	[X] 1 mg/mL  [ ] IV PCA Fentanyl		[ ] 50 micrograms/mL    Demand dose: 0.2 mg     Lockout: 6 minutes   Continuous Rate: 0 mg/hr  4 Hour Limit: 4 mg    MEDICATIONS  (STANDING):  acetaminophen     Tablet .. 975 milliGRAM(s) Oral <User Schedule>  diphenoxylate/atropine 1 Tablet(s) Oral once  diphtheria/tetanus/pertussis (acellular) Vaccine (Adacel) 0.5 milliLiter(s) IntraMuscular once  heparin   Injectable 5000 Unit(s) SubCutaneous every 12 hours  HYDROmorphone PCA (1 mG/mL) 30 milliLiter(s) PCA Continuous PCA Continuous  ibuprofen  Tablet. 600 milliGRAM(s) Oral every 6 hours  ketorolac   Injectable 30 milliGRAM(s) IV Push every 6 hours  lactated ringers. 1000 milliLiter(s) (125 mL/Hr) IV Continuous <Continuous>  oxytocin Infusion 333.333 milliUNIT(s)/Min (1000 mL/Hr) IV Continuous <Continuous>  oxytocin Infusion 333.333 milliUNIT(s)/Min (1000 mL/Hr) IV Continuous <Continuous>  oxytocin Infusion. 2 milliUNIT(s)/Min (2 mL/Hr) IV Continuous <Continuous>  remifentanil Infusion 0.025 MICROgram(s)/kG/Min (2.16 mL/Hr) IV Continuous <Continuous>    MEDICATIONS  (PRN):  butorphanol Injectable 0.125 milliGRAM(s) IV Push every 6 hours PRN Pruritus  diphenhydrAMINE 25 milliGRAM(s) Oral every 6 hours PRN Pruritus  HYDROmorphone PCA (1 mG/mL) Rescue Clinician Bolus 0.5 milliGRAM(s) IV Push every 15 minutes PRN for Pain Scale GREATER THAN 6  lanolin Ointment 1 Application(s) Topical every 6 hours PRN Sore Nipples  magnesium hydroxide Suspension 30 milliLiter(s) Oral two times a day PRN Constipation  nalbuphine Injectable 2.5 milliGRAM(s) IV Push every 6 hours PRN Pruritus  naloxone Injectable 0.1 milliGRAM(s) IV Push every 3 minutes PRN For ANY of the following changes in patient status:  A. RR LESS THAN 10 breaths per minute, B. Oxygen saturation LESS THAN 90%, C. Sedation score of 6  ondansetron Injectable 4 milliGRAM(s) IV Push every 6 hours PRN Nausea  oxyCODONE    IR 5 milliGRAM(s) Oral every 3 hours PRN Moderate to Severe Pain (4-10)  oxyCODONE    IR 5 milliGRAM(s) Oral once PRN Moderate to Severe Pain (4-10)  simethicone 80 milliGRAM(s) Chew every 4 hours PRN Gas      OBJECTIVE:    Sedation Score:	[ X] Alert	[ ] Drowsy 	[ ] Arousable	[ ] Asleep	[ ] Unresponsive    Side Effects:	[X ] None	[ ] Nausea	[ ] Vomiting	[ ] Pruritus  		[ ] Other:    Vital Signs Last 24 Hrs  T(C): 36.6 (29 Jul 2023 08:56), Max: 37.3 (28 Jul 2023 21:00)  T(F): 97.9 (29 Jul 2023 08:56), Max: 99.1 (28 Jul 2023 21:00)  HR: 64 (29 Jul 2023 08:56) (64 - 108)  BP: 95/58 (29 Jul 2023 08:56) (94/60 - 149/75)  BP(mean): 92 (28 Jul 2023 12:30) (92 - 92)  RR: 18 (29 Jul 2023 08:56) (16 - 20)  SpO2: 96% (29 Jul 2023 08:56) (95% - 100%)    Parameters below as of 29 Jul 2023 08:56  Patient On (Oxygen Delivery Method): room air        ASSESSMENT/ PLAN    Therapy to  be:               [X] Continued   [ ] Discontinued   [ ] Changed to PRN Analgesics    Documentation and Verification of current medications:   [X] Done	[ ] Not done, not eligible    Comments: will continue PCA this morning & will d/c PCA this afternoon 24hr post-op and transition to PO pain Rx.

## 2023-07-29 NOTE — PROGRESS NOTE ADULT - ATTENDING COMMENTS
Pt is s/p  section. She is doing well without complaints. Denies HA, lightheadedness, dizziness, CP, SOB, palpitations, abdominal pain, heavy vaginal bleeding.     T(C): 36.6 (23 @ 08:56), Max: 37.3 (23 @ 21:00)  HR: 64 (23 @ 08:56) (64 - 108)  BP: 95/58 (23 @ 08:56) (94/60 - 149/75)  RR: 18 (23 @ 08:56) (16 - 20)  SpO2: 96% (23 @ 08:56) (95% - 100%)    Physical exam:   Abd: NTND, fundus firm and well contracted, incision CDI  VE: Appropriate vaginal bleeding    Plan  -Continue routine post partum care  -Monitor VS  -Postpartum anemia - w/ repeat H/H uptrending -  -Agree with above plan/examination    osiel

## 2023-07-29 NOTE — CHART NOTE - NSCHARTNOTEFT_GEN_A_CORE
31 y/o  POD 1 s/p pLTCS, with critical Hct of 6.6    Pt seen and examined at bedside. Denies dizziness, able to ambulate without issue.     Vital Signs Last 24 Hrs  T(C): 36.6 (2023 08:56), Max: 37.3 (2023 21:00)  T(F): 97.9 (2023 08:56), Max: 99.1 (2023 21:00)  HR: 64 (2023 08:56) (64 - 108)  BP: 95/58 (2023 08:56) (94/60 - 149/75)  BP(mean): 92 (2023 12:30) (92 - 92)  RR: 18 (2023 08:56) (16 - 20)  SpO2: 96% (2023 08:56) (95% - 100%)    Parameters below as of 2023 08:56  Patient On (Oxygen Delivery Method): room air               7.8    22.15 )-----------( 153      ( 07-29 @ 09:14 )             24.1       Blood Type: A Positive      A&P:  - , Hb 10.6>6.6. On repeat this AM, Hb was 7.8  - pt is asymptomatic with VSS.     Lorin Weber, PGY1  d/w Dr. Powell

## 2023-07-29 NOTE — CHART NOTE - NSCHARTNOTEFT_GEN_A_CORE
Evaluated patient due to nursing concern that patient was unable to stand for orthostatics. Upon evaluation, patient resting comfortably in bed.   Patient denies any lightheadedness, SOB, CP, abdominal pain. Patient states she felt unable to stand for orthostatics due to incisional pain. Patient denies neurological deficits at baseline.    O:   Vital Signs Last 24 Hrs  T(C): 37.1 (29 Jul 2023 00:16), Max: 37.3 (28 Jul 2023 21:00)  T(F): 98.8 (29 Jul 2023 00:16), Max: 99.1 (28 Jul 2023 21:00)  HR: 74 (28 Jul 2023 21:00) (52 - 108)  BP: 94/60 (28 Jul 2023 21:00) (94/60 - 149/75)  BP(mean): 92 (28 Jul 2023 12:30) (92 - 92)  RR: 18 (29 Jul 2023 00:16) (12 - 20)  SpO2: 97% (29 Jul 2023 00:16) (92% - 100%)    GEN: No acute distress  Abdomen: Nontender, nondistended. Uterine fundus firm. Light lochia on pad. Incision c/d/i.   Ext: B/l LE 4/5, active and passive movement without difficulty or pain. SCDs in place.    A/P:  Patient is POD#1 from pLTCS for arrest of labor c/b , PMH of spina bifida, unable to receive epidural. IM Methergine, Hemabate, Pitocin, and Cyto PA administered given during surgery for boggy uterus. Uterus firm on exam, c/d/i incision. B/l LE strength intact. Hemodynamically stable at this time. Inability to stand for orthostatics likely 2/2 to poorly controlled incisional pain.   - Optimize pain control via PCA and PO/IV medications  - Reattempt orthostatic vitals    PANCHO Hastings, PGY-1

## 2023-07-29 NOTE — CHART NOTE - NSCHARTNOTEFT_GEN_A_CORE
RN called pt was due to void after silva removed this AM with only 150cc urine out. Bladder scan showed >550cc, RN inserted silva and drained 800cc.       I&O's Detail    28 Jul 2023 07:01  -  29 Jul 2023 07:00  --------------------------------------------------------  IN:    Lactated Ringers: 2000 mL    Oxytocin.: 23.2 mL  Total IN: 2023.2 mL    OUT:    Blood Loss (mL): 720 mL    Indwelling Catheter - Urethral (mL): 1700 mL  Total OUT: 2420 mL    Total NET: -396.8 mL      29 Jul 2023 07:01  -  29 Jul 2023 17:18  --------------------------------------------------------  IN:  Total IN: 0 mL    OUT:    Voided (mL): 50 mL  Total OUT: 50 mL    Total NET: -50 mL      A&P:  - pt with overextended bladder and inability to void, with significant output after silva  - will do 6-8 hours of bladder rest. Silva in at 5pm tonight.     Lorin Weber, PGY1  d/w Dr. Powell

## 2023-07-29 NOTE — PROGRESS NOTE ADULT - ASSESSMENT
Patient is POD#1 from pLTCS under GETA for arrest c/b  sp IM methergine, hemabate, and cyto MS. Meeting postpartum milestones with the exception of pain control. PMH complicated by spinal bifida. Vital signs are stable and physical exam is unremarkable.  #Postpartum  - Pain control: Patient educated that she can use her PCA with hydromorphone more frequently for better pain control  - Increase ambulation  - Continue regular diet  - Renew IV fluids  - Reyes is removed  - F/u AM CBC    PANCHO Hastings, PGY-1

## 2023-07-29 NOTE — PROVIDER CONTACT NOTE (OTHER) - ASSESSMENT
Several attempts to void. OOB ambulating.  Denies bladder pressure only incisional pain.  Bladder scanner used showing 550ml.  IV LR infusing.

## 2023-07-29 NOTE — PROGRESS NOTE ADULT - SUBJECTIVE AND OBJECTIVE BOX
Patient seen and examined at bedside, no acute overnight events. Pain not well controlled at this time. Patient only pressed PCA once for pain overnight and was not aware that it could be pressed multiple times for pain. Patient with limited ambulation due to incisional pain. Tolerating regular diet. Has not yet passed flatus, awaiting void after silva removal. Denies CP, SOB, N/V, HA, blurred vision, epigastric pain.    Vital Signs Last 24 Hours  T(C): 36.8 (07-29-23 @ 05:42), Max: 37.3 (07-28-23 @ 21:00)  HR: 74 (07-28-23 @ 21:00) (56 - 108)  BP: 94/60 (07-28-23 @ 21:00) (94/60 - 149/75)  RR: 18 (07-29-23 @ 05:42) (16 - 20)  SpO2: 97% (07-29-23 @ 05:42) (93% - 100%)    I&O's Summary    28 Jul 2023 07:01  -  29 Jul 2023 07:00  --------------------------------------------------------  IN: 2023.2 mL / OUT: 2420 mL / NET: -396.8 mL        Physical Exam:  General: NAD  Abdomen: Soft, non-tender, non-distended, fundus firm  Incision: Pfannenstiel incision CDI, subcuticular suture closure  Pelvic: Lochia wnl    Labs:    Blood Type: A Positive  Antibody Screen: Negative  RPR: Negative                 10.6   11.74 )-----------( 161      ( 07-27 @ 21:44 )             32.8         MEDICATIONS  (STANDING):  acetaminophen     Tablet .. 975 milliGRAM(s) Oral <User Schedule>  diphenoxylate/atropine 1 Tablet(s) Oral once  diphtheria/tetanus/pertussis (acellular) Vaccine (Adacel) 0.5 milliLiter(s) IntraMuscular once  heparin   Injectable 5000 Unit(s) SubCutaneous every 12 hours  HYDROmorphone PCA (1 mG/mL) 30 milliLiter(s) PCA Continuous PCA Continuous  ibuprofen  Tablet. 600 milliGRAM(s) Oral every 6 hours  ketorolac   Injectable 30 milliGRAM(s) IV Push every 6 hours  lactated ringers. 1000 milliLiter(s) (125 mL/Hr) IV Continuous <Continuous>  oxytocin Infusion 333.333 milliUNIT(s)/Min (1000 mL/Hr) IV Continuous <Continuous>  oxytocin Infusion 333.333 milliUNIT(s)/Min (1000 mL/Hr) IV Continuous <Continuous>  oxytocin Infusion. 2 milliUNIT(s)/Min (2 mL/Hr) IV Continuous <Continuous>  remifentanil Infusion 0.025 MICROgram(s)/kG/Min (2.16 mL/Hr) IV Continuous <Continuous>    MEDICATIONS  (PRN):  butorphanol Injectable 0.125 milliGRAM(s) IV Push every 6 hours PRN Pruritus  diphenhydrAMINE 25 milliGRAM(s) Oral every 6 hours PRN Pruritus  HYDROmorphone PCA (1 mG/mL) Rescue Clinician Bolus 0.5 milliGRAM(s) IV Push every 15 minutes PRN for Pain Scale GREATER THAN 6  lanolin Ointment 1 Application(s) Topical every 6 hours PRN Sore Nipples  magnesium hydroxide Suspension 30 milliLiter(s) Oral two times a day PRN Constipation  nalbuphine Injectable 2.5 milliGRAM(s) IV Push every 6 hours PRN Pruritus  naloxone Injectable 0.1 milliGRAM(s) IV Push every 3 minutes PRN For ANY of the following changes in patient status:  A. RR LESS THAN 10 breaths per minute, B. Oxygen saturation LESS THAN 90%, C. Sedation score of 6  ondansetron Injectable 4 milliGRAM(s) IV Push every 6 hours PRN Nausea  oxyCODONE    IR 5 milliGRAM(s) Oral every 3 hours PRN Moderate to Severe Pain (4-10)  oxyCODONE    IR 5 milliGRAM(s) Oral once PRN Moderate to Severe Pain (4-10)  simethicone 80 milliGRAM(s) Chew every 4 hours PRN Gas

## 2023-07-30 ENCOUNTER — TRANSCRIPTION ENCOUNTER (OUTPATIENT)
Age: 33
End: 2023-07-30

## 2023-07-30 RX ORDER — IBUPROFEN 200 MG
1 TABLET ORAL
Qty: 0 | Refills: 0 | DISCHARGE
Start: 2023-07-30

## 2023-07-30 RX ORDER — ACETAMINOPHEN 500 MG
3 TABLET ORAL
Qty: 0 | Refills: 0 | DISCHARGE
Start: 2023-07-30

## 2023-07-30 RX ADMIN — Medication 975 MILLIGRAM(S): at 03:34

## 2023-07-30 RX ADMIN — Medication 975 MILLIGRAM(S): at 22:20

## 2023-07-30 RX ADMIN — Medication 30 MILLIGRAM(S): at 06:18

## 2023-07-30 RX ADMIN — Medication 975 MILLIGRAM(S): at 10:36

## 2023-07-30 RX ADMIN — Medication 975 MILLIGRAM(S): at 02:49

## 2023-07-30 RX ADMIN — Medication 30 MILLIGRAM(S): at 06:45

## 2023-07-30 RX ADMIN — Medication 975 MILLIGRAM(S): at 15:29

## 2023-07-30 RX ADMIN — Medication 600 MILLIGRAM(S): at 19:01

## 2023-07-30 RX ADMIN — Medication 30 MILLIGRAM(S): at 00:00

## 2023-07-30 RX ADMIN — Medication 600 MILLIGRAM(S): at 18:37

## 2023-07-30 RX ADMIN — Medication 30 MILLIGRAM(S): at 13:26

## 2023-07-30 RX ADMIN — Medication 975 MILLIGRAM(S): at 17:04

## 2023-07-30 RX ADMIN — Medication 30 MILLIGRAM(S): at 12:47

## 2023-07-30 RX ADMIN — Medication 600 MILLIGRAM(S): at 23:33

## 2023-07-30 RX ADMIN — HEPARIN SODIUM 5000 UNIT(S): 5000 INJECTION INTRAVENOUS; SUBCUTANEOUS at 09:13

## 2023-07-30 RX ADMIN — Medication 975 MILLIGRAM(S): at 21:20

## 2023-07-30 RX ADMIN — HEPARIN SODIUM 5000 UNIT(S): 5000 INJECTION INTRAVENOUS; SUBCUTANEOUS at 21:21

## 2023-07-30 RX ADMIN — Medication 975 MILLIGRAM(S): at 09:13

## 2023-07-30 NOTE — DISCHARGE NOTE OB - PATIENT PORTAL LINK FT
You can access the FollowMyHealth Patient Portal offered by [facility] by registering at the following website: [email or web address]. By joining [email or web address]s FollowMyHealth portal, you will also be able to view your health information using other applications (apps) compatible with our system.

## 2023-07-30 NOTE — DISCHARGE NOTE OB - CARE PLAN
1 Principal Discharge DX:	Single delivery by  section  Assessment and plan of treatment:	After discharge, please stay on pelvic rest for 6 weeks, meaning no sexual intercourse, no tampons and no douching.  No driving for 2 weeks as women can loose a lot of blood during delivery and there is a possibility of being lightheaded/fainting.  No lifting objects heavier than a gallon of milk for two weeks.  Expect to have vaginal bleeding/spotting for up to six weeks.  The bleeding should get lighter and more white/light brown with time.  For bleeding soaking more than a pad an hour or passing clots greater than the size of your fist, come in to the emergency department.    Follow up in the office in 2-3 weeks for incision check.  Call the office for noticeable increase in redness or swelling at incision, discharge from incision, or opening of skin at incision site.     You can take up to 600mg Ibuprofen every 6 hours and/or tylenol 975mg every 6 hours. Alternate medications every 3 hours (Take Ibuprofen in the morning and then tylenol 3 hours later)

## 2023-07-30 NOTE — DISCHARGE NOTE OB - NS MD DC FALL RISK RISK
For information on Fall & Injury Prevention, visit: [email or web address] OR  [email or web address] OR  https://www.cdc.gov/steadi/patient.html

## 2023-07-30 NOTE — DISCHARGE NOTE OB - MATERIALS PROVIDED
[facility] [place] Screening Program/Breastfeeding Log/Breastfeeding Mother’s Support Group Information/Guide to Postpartum Care/[facility] Hearing Screen Program/Back To Sleep Handout/Shaken Baby Prevention Handout/Breastfeeding Guide and Packet/Birth Certificate Instructions

## 2023-07-30 NOTE — PROGRESS NOTE ADULT - ATTENDING COMMENTS
Pt is s/p  section. She is doing well without complaints. Denies HA, lightheadedness, dizziness, CP, SOB, palpitations, abdominal pain, heavy vaginal bleeding.     T(C): 36.9 (23 @ 02:50), Max: 36.9 (23 @ 12:33)  HR: 93 (23 @ 02:50) (70 - 93)  BP: 112/66 (23 @ 02:50) (96/62 - 112/66)  RR: 18 (23 @ 02:50) (18 - 18)  SpO2: 95% (23 @ 02:50) (95% - 96%)    Physical exam:   Abd: NTND, fundus firm and well contracted, incision CDI  VE: Appropriate vaginal bleeding    Plan  -Urinary retention - silva replaced & d/c @ 730am - pt DTV then can be discharged  -Continue routine post partum care  -Monitor VS  -Agree with above plan/examination    osiel

## 2023-07-30 NOTE — PROGRESS NOTE ADULT - SUBJECTIVE AND OBJECTIVE BOX
R1 POD#2 pLTCSProgress Note    31y/o  POD#2 from pLTCS for AOD complicated by urinary retention.    Patient seen and examined at bedside, no acute overnight events. No acute complaints, pain well controlled. Patient is ambulating and tolerating regular diet. Has passed flatus. Patient voiding via silva due to urinary retention. Denies CP, SOB, N/V, HA, blurred vision, epigastric pain. Bleeding minimal.    Vital Signs Last 24 Hours  T(C): 36.9 (23 @ 02:50), Max: 36.9 (23 @ 12:33)  HR: 93 (23 @ 02:50) (64 - 93)  BP: 112/66 (23 @ 02:50) (95/58 - 112/66)  RR: 18 (23 @ 02:50) (18 - 18)  SpO2: 95% (23 @ 02:50) (95% - 96%)    I&O's Summary    2023 07:01  -  2023 07:00  --------------------------------------------------------  IN: .2 mL / OUT: 2420 mL / NET: -396.8 mL    2023 07:01  -  2023 06:45  --------------------------------------------------------  IN: 0 mL / OUT: 2325 mL / NET: -2325 mL        Physical Exam:  General: NAD  Abdomen: Soft, non-tender, non-distended, fundus firm  Incision: Pfannenstiel incision CDI, subcuticular suture closure  Pelvic: Lochia wnl    Labs:    Blood Type: A Positive  Antibody Screen: Negative  RPR: Negative               7.8    22.15 )-----------( 153      (  @ 09:14 )             24.1                6.6    15.86 )-----------( 121      (  @ 06:37 )             20.0                10.6   11.74 )-----------( 161      (  @ 21:44 )             32.8         MEDICATIONS  (STANDING):  acetaminophen     Tablet .. 975 milliGRAM(s) Oral <User Schedule>  diphenoxylate/atropine 1 Tablet(s) Oral once  diphtheria/tetanus/pertussis (acellular) Vaccine (Adacel) 0.5 milliLiter(s) IntraMuscular once  heparin   Injectable 5000 Unit(s) SubCutaneous every 12 hours  ibuprofen  Tablet. 600 milliGRAM(s) Oral every 6 hours  ketorolac   Injectable 30 milliGRAM(s) IV Push every 6 hours  lactated ringers. 1000 milliLiter(s) (125 mL/Hr) IV Continuous <Continuous>  oxytocin Infusion 333.333 milliUNIT(s)/Min (1000 mL/Hr) IV Continuous <Continuous>  oxytocin Infusion 333.333 milliUNIT(s)/Min (1000 mL/Hr) IV Continuous <Continuous>  oxytocin Infusion. 2 milliUNIT(s)/Min (2 mL/Hr) IV Continuous <Continuous>  remifentanil Infusion 0.025 MICROgram(s)/kG/Min (2.16 mL/Hr) IV Continuous <Continuous>    MEDICATIONS  (PRN):  diphenhydrAMINE 25 milliGRAM(s) Oral every 6 hours PRN Pruritus  lanolin Ointment 1 Application(s) Topical every 6 hours PRN Sore Nipples  magnesium hydroxide Suspension 30 milliLiter(s) Oral two times a day PRN Constipation  oxyCODONE    IR 5 milliGRAM(s) Oral every 3 hours PRN Moderate to Severe Pain (4-10)  oxyCODONE    IR 5 milliGRAM(s) Oral once PRN Moderate to Severe Pain (4-10)  simethicone 80 milliGRAM(s) Chew every 4 hours PRN Gas     R1 POD#3 pLTCSProgress Note    33y/o  POD#2 from pLTCS for AOD complicated by urinary retention.    Patient seen and examined at bedside, no acute overnight events. No acute complaints, pain well controlled. Patient is ambulating and tolerating regular diet. Has passed flatus. Patient voiding via silva due to urinary retention. Denies CP, SOB, N/V, HA, blurred vision, epigastric pain. Bleeding minimal.    Vital Signs Last 24 Hours  T(C): 36.9 (23 @ 02:50), Max: 36.9 (23 @ 12:33)  HR: 93 (23 @ 02:50) (64 - 93)  BP: 112/66 (23 @ 02:50) (95/58 - 112/66)  RR: 18 (23 @ 02:50) (18 - 18)  SpO2: 95% (23 @ 02:50) (95% - 96%)    I&O's Summary    2023 07:01  -  2023 07:00  --------------------------------------------------------  IN: .2 mL / OUT: 2420 mL / NET: -396.8 mL    2023 07:01  -  2023 06:45  --------------------------------------------------------  IN: 0 mL / OUT: 2325 mL / NET: -2325 mL        Physical Exam:  General: NAD  Abdomen: Soft, non-tender, non-distended, fundus firm  Incision: Pfannenstiel incision CDI, subcuticular suture closure  Pelvic: Lochia wnl    Labs:    Blood Type: A Positive  Antibody Screen: Negative  RPR: Negative               7.8    22.15 )-----------( 153      (  @ 09:14 )             24.1                6.6    15.86 )-----------( 121      (  @ 06:37 )             20.0                10.6   11.74 )-----------( 161      (  @ 21:44 )             32.8         MEDICATIONS  (STANDING):  acetaminophen     Tablet .. 975 milliGRAM(s) Oral <User Schedule>  diphenoxylate/atropine 1 Tablet(s) Oral once  diphtheria/tetanus/pertussis (acellular) Vaccine (Adacel) 0.5 milliLiter(s) IntraMuscular once  heparin   Injectable 5000 Unit(s) SubCutaneous every 12 hours  ibuprofen  Tablet. 600 milliGRAM(s) Oral every 6 hours  ketorolac   Injectable 30 milliGRAM(s) IV Push every 6 hours  lactated ringers. 1000 milliLiter(s) (125 mL/Hr) IV Continuous <Continuous>  oxytocin Infusion 333.333 milliUNIT(s)/Min (1000 mL/Hr) IV Continuous <Continuous>  oxytocin Infusion 333.333 milliUNIT(s)/Min (1000 mL/Hr) IV Continuous <Continuous>  oxytocin Infusion. 2 milliUNIT(s)/Min (2 mL/Hr) IV Continuous <Continuous>  remifentanil Infusion 0.025 MICROgram(s)/kG/Min (2.16 mL/Hr) IV Continuous <Continuous>    MEDICATIONS  (PRN):  diphenhydrAMINE 25 milliGRAM(s) Oral every 6 hours PRN Pruritus  lanolin Ointment 1 Application(s) Topical every 6 hours PRN Sore Nipples  magnesium hydroxide Suspension 30 milliLiter(s) Oral two times a day PRN Constipation  oxyCODONE    IR 5 milliGRAM(s) Oral every 3 hours PRN Moderate to Severe Pain (4-10)  oxyCODONE    IR 5 milliGRAM(s) Oral once PRN Moderate to Severe Pain (4-10)  simethicone 80 milliGRAM(s) Chew every 4 hours PRN Gas

## 2023-07-30 NOTE — DISCHARGE NOTE OB - CARE PROVIDER_API CALL
Sahrif Powell  Obstetrics and Gynecology  [address], [address]  [place] [address]  Phone: [phone]  Fax: [phone]  Follow Up Time:

## 2023-07-30 NOTE — DISCHARGE NOTE OB - DO NOT DRIVE OR OPERATE HEAVY MACHINERY WHEN TAKING NARCOTICS/PRESCRIPTION PAIN MEDICATION THAT CAN CHANGE YOUR MENTAL STATE OR CAUSE DROWSINESS
RN assisted pt to ambulate to bathroom.  Patient tolerated well with little assistance.   Statement Selected

## 2023-07-30 NOTE — PROGRESS NOTE ADULT - ASSESSMENT
31y/o  POD#2 from pLTCS for AOD complicated by urinary retention. Overall, patient stable and recovering well postoperatively.    #urinary retention   - Voiding via silva until 7:30am     #Postpartum state  - Continue with po analgesia  - Increase ambulation  - Continue regular diet      José Miguel Phillip  PGY-1 31y/o  POD#3 from pLTCS for AOD complicated by urinary retention. Overall, patient stable and recovering well postoperatively.    #urinary retention   - Voiding via silva until 7:30am     #Postpartum state  - Continue with po analgesia  - Increase ambulation  - Continue regular diet      José Miguel Phillip  PGY-1

## 2023-07-31 VITALS
RESPIRATION RATE: 18 BRPM | TEMPERATURE: 99 F | SYSTOLIC BLOOD PRESSURE: 118 MMHG | DIASTOLIC BLOOD PRESSURE: 72 MMHG | HEART RATE: 70 BPM | OXYGEN SATURATION: 97 %

## 2023-07-31 PROCEDURE — 85027 COMPLETE CBC AUTOMATED: CPT

## 2023-07-31 PROCEDURE — 86901 BLOOD TYPING SEROLOGIC RH(D): CPT

## 2023-07-31 PROCEDURE — C1889: CPT

## 2023-07-31 PROCEDURE — 36415 COLL VENOUS BLD VENIPUNCTURE: CPT

## 2023-07-31 PROCEDURE — 59050 FETAL MONITOR W/REPORT: CPT

## 2023-07-31 PROCEDURE — C9399: CPT

## 2023-07-31 PROCEDURE — 85025 COMPLETE CBC W/AUTO DIFF WBC: CPT

## 2023-07-31 PROCEDURE — 86850 RBC ANTIBODY SCREEN: CPT

## 2023-07-31 PROCEDURE — 86780 TREPONEMA PALLIDUM: CPT

## 2023-07-31 PROCEDURE — 86900 BLOOD TYPING SEROLOGIC ABO: CPT

## 2023-07-31 PROCEDURE — 59025 FETAL NON-STRESS TEST: CPT

## 2023-07-31 RX ADMIN — Medication 600 MILLIGRAM(S): at 00:30

## 2023-07-31 RX ADMIN — Medication 975 MILLIGRAM(S): at 08:52

## 2023-07-31 RX ADMIN — Medication 975 MILLIGRAM(S): at 09:22

## 2023-07-31 RX ADMIN — Medication 600 MILLIGRAM(S): at 06:14

## 2023-07-31 RX ADMIN — Medication 600 MILLIGRAM(S): at 12:59

## 2023-07-31 RX ADMIN — Medication 600 MILLIGRAM(S): at 12:29

## 2023-07-31 RX ADMIN — Medication 975 MILLIGRAM(S): at 02:29

## 2023-07-31 RX ADMIN — Medication 600 MILLIGRAM(S): at 05:38

## 2023-07-31 RX ADMIN — Medication 975 MILLIGRAM(S): at 03:25

## 2023-07-31 NOTE — PROGRESS NOTE ADULT - SUBJECTIVE AND OBJECTIVE BOX
OB Progress Note: LTCS, POD#3    S: 33yo POD#3 s/p pLTCS. Pain is moderately well controlled. PCA d/c'd on POD1, pt taking tylenol and motrin, not taking oxycodone. She is tolerating a regular diet and passing flatus. She is voiding spontaneously, and ambulating without difficulty. Endorses light vaginal bleeding, soaking <1 pad/hr. Denies CP/SOB. Denies lightheadedness/dizziness. Denies N/V.    O:  Vitals:  Vital Signs Last 24 Hrs  T(C): 37 (31 Jul 2023 05:14), Max: 37 (30 Jul 2023 13:06)  T(F): 98.6 (31 Jul 2023 05:14), Max: 98.6 (30 Jul 2023 13:06)  HR: 70 (31 Jul 2023 05:14) (70 - 83)  BP: 118/72 (31 Jul 2023 05:14) (105/67 - 118/72)  BP(mean): --  RR: 18 (31 Jul 2023 05:14) (18 - 18)  SpO2: 97% (31 Jul 2023 05:14) (97% - 97%)    Parameters below as of 31 Jul 2023 05:14  Patient On (Oxygen Delivery Method): room air        MEDICATIONS  (STANDING):  acetaminophen     Tablet .. 975 milliGRAM(s) Oral <User Schedule>  diphenoxylate/atropine 1 Tablet(s) Oral once  diphtheria/tetanus/pertussis (acellular) Vaccine (Adacel) 0.5 milliLiter(s) IntraMuscular once  heparin   Injectable 5000 Unit(s) SubCutaneous every 12 hours  ibuprofen  Tablet. 600 milliGRAM(s) Oral every 6 hours  lactated ringers. 1000 milliLiter(s) (125 mL/Hr) IV Continuous <Continuous>  oxytocin Infusion 333.333 milliUNIT(s)/Min (1000 mL/Hr) IV Continuous <Continuous>  oxytocin Infusion 333.333 milliUNIT(s)/Min (1000 mL/Hr) IV Continuous <Continuous>  oxytocin Infusion. 2 milliUNIT(s)/Min (2 mL/Hr) IV Continuous <Continuous>  remifentanil Infusion 0.025 MICROgram(s)/kG/Min (2.16 mL/Hr) IV Continuous <Continuous>      MEDICATIONS  (PRN):  diphenhydrAMINE 25 milliGRAM(s) Oral every 6 hours PRN Pruritus  lanolin Ointment 1 Application(s) Topical every 6 hours PRN Sore Nipples  magnesium hydroxide Suspension 30 milliLiter(s) Oral two times a day PRN Constipation  oxyCODONE    IR 5 milliGRAM(s) Oral once PRN Moderate to Severe Pain (4-10)  oxyCODONE    IR 5 milliGRAM(s) Oral every 3 hours PRN Moderate to Severe Pain (4-10)  simethicone 80 milliGRAM(s) Chew every 4 hours PRN Gas      Labs:  Blood type: A Positive  Rubella IgG: RPR: Negative                          7.8<L>   22.15<H> >-----------< 153    ( 07-29 @ 09:14 )             24.1<L>                        6.6<LL>   15.86<H> >-----------< 121<L>    ( 07-29 @ 06:37 )             20.0<LL>                  PE:  General: NAD  Heart: extremities well-perfused  Lungs: breathing normally  Abdomen: Soft, appropriately tender, incision c/d/i, fundus firm  Extremities: No erythema, no pitting edema  Gyn: lochia wnl

## 2023-07-31 NOTE — PROGRESS NOTE ADULT - ASSESSMENT
A/P: 31yo POD#2 s/p LTCS.  Patient is stable and doing well post-operatively.    - Continue regular diet.  - Increase ambulation.  - Continue motrin, tylenol, oxycodone PRN for pain control.   - PCA d/c'd POD1. Pt advised oxycodone is available to her for increased pain control     #PP urinary retention  - POD1 retention 550 on bladder scan --> silva 800cc out; silva kept in overnight  - POD2 silva removed, pt voiding spontaneously     Lorin Weber, PGY1

## 2023-08-07 ENCOUNTER — EMERGENCY (EMERGENCY)
Facility: HOSPITAL | Age: 33
LOS: 1 days | Discharge: HOME | End: 2023-08-07
Attending: EMERGENCY MEDICINE
Payer: COMMERCIAL

## 2023-08-07 ENCOUNTER — TRANSCRIPTION ENCOUNTER (OUTPATIENT)
Age: 33
End: 2023-08-07

## 2023-08-07 VITALS
DIASTOLIC BLOOD PRESSURE: 72 MMHG | HEART RATE: 81 BPM | HEIGHT: 61 IN | SYSTOLIC BLOOD PRESSURE: 119 MMHG | RESPIRATION RATE: 18 BRPM | OXYGEN SATURATION: 97 % | TEMPERATURE: 98 F | WEIGHT: 93.92 LBS

## 2023-08-07 LAB
ALBUMIN SERPL ELPH-MCNC: 3.5 G/DL — SIGNIFICANT CHANGE UP (ref 3.3–5)
ALP SERPL-CCNC: 126 U/L — HIGH (ref 40–120)
ALT FLD-CCNC: 27 U/L — SIGNIFICANT CHANGE UP (ref 10–45)
ANION GAP SERPL CALC-SCNC: 14 MMOL/L — SIGNIFICANT CHANGE UP (ref 5–17)
APPEARANCE UR: CLEAR — SIGNIFICANT CHANGE UP
APTT BLD: 30.3 SEC — SIGNIFICANT CHANGE UP (ref 24.5–35.6)
AST SERPL-CCNC: 37 U/L — SIGNIFICANT CHANGE UP (ref 10–40)
BACTERIA # UR AUTO: NEGATIVE — SIGNIFICANT CHANGE UP
BASOPHILS # BLD AUTO: 0.03 K/UL — SIGNIFICANT CHANGE UP (ref 0–0.2)
BASOPHILS NFR BLD AUTO: 0.3 % — SIGNIFICANT CHANGE UP (ref 0–2)
BILIRUB SERPL-MCNC: 0.3 MG/DL — SIGNIFICANT CHANGE UP (ref 0.2–1.2)
BILIRUB UR-MCNC: NEGATIVE — SIGNIFICANT CHANGE UP
BLD GP AB SCN SERPL QL: NEGATIVE — SIGNIFICANT CHANGE UP
BUN SERPL-MCNC: 10 MG/DL — SIGNIFICANT CHANGE UP (ref 7–23)
CALCIUM SERPL-MCNC: 9.7 MG/DL — SIGNIFICANT CHANGE UP (ref 8.4–10.5)
CHLORIDE SERPL-SCNC: 105 MMOL/L — SIGNIFICANT CHANGE UP (ref 96–108)
CO2 SERPL-SCNC: 19 MMOL/L — LOW (ref 22–31)
COLOR SPEC: SIGNIFICANT CHANGE UP
CREAT SERPL-MCNC: 0.55 MG/DL — SIGNIFICANT CHANGE UP (ref 0.5–1.3)
DIFF PNL FLD: ABNORMAL
EGFR: 125 ML/MIN/1.73M2 — SIGNIFICANT CHANGE UP
EOSINOPHIL # BLD AUTO: 0.05 K/UL — SIGNIFICANT CHANGE UP (ref 0–0.5)
EOSINOPHIL NFR BLD AUTO: 0.4 % — SIGNIFICANT CHANGE UP (ref 0–6)
EPI CELLS # UR: 16 /HPF — HIGH
GLUCOSE SERPL-MCNC: 95 MG/DL — SIGNIFICANT CHANGE UP (ref 70–99)
GLUCOSE UR QL: NEGATIVE — SIGNIFICANT CHANGE UP
HCT VFR BLD CALC: 26.6 % — LOW (ref 34.5–45)
HGB BLD-MCNC: 8.3 G/DL — LOW (ref 11.5–15.5)
HYALINE CASTS # UR AUTO: 8 /LPF — HIGH (ref 0–2)
IMM GRANULOCYTES NFR BLD AUTO: 0.9 % — SIGNIFICANT CHANGE UP (ref 0–0.9)
INR BLD: 0.98 RATIO — SIGNIFICANT CHANGE UP (ref 0.85–1.18)
KETONES UR-MCNC: NEGATIVE — SIGNIFICANT CHANGE UP
LEUKOCYTE ESTERASE UR-ACNC: ABNORMAL
LYMPHOCYTES # BLD AUTO: 1.63 K/UL — SIGNIFICANT CHANGE UP (ref 1–3.3)
LYMPHOCYTES # BLD AUTO: 14 % — SIGNIFICANT CHANGE UP (ref 13–44)
MCHC RBC-ENTMCNC: 27.6 PG — SIGNIFICANT CHANGE UP (ref 27–34)
MCHC RBC-ENTMCNC: 31.2 GM/DL — LOW (ref 32–36)
MCV RBC AUTO: 88.4 FL — SIGNIFICANT CHANGE UP (ref 80–100)
MONOCYTES # BLD AUTO: 0.55 K/UL — SIGNIFICANT CHANGE UP (ref 0–0.9)
MONOCYTES NFR BLD AUTO: 4.7 % — SIGNIFICANT CHANGE UP (ref 2–14)
NEUTROPHILS # BLD AUTO: 9.29 K/UL — HIGH (ref 1.8–7.4)
NEUTROPHILS NFR BLD AUTO: 79.7 % — HIGH (ref 43–77)
NITRITE UR-MCNC: NEGATIVE — SIGNIFICANT CHANGE UP
NRBC # BLD: 0 /100 WBCS — SIGNIFICANT CHANGE UP (ref 0–0)
PH UR: 7 — SIGNIFICANT CHANGE UP (ref 5–8)
PLATELET # BLD AUTO: 649 K/UL — HIGH (ref 150–400)
POTASSIUM SERPL-MCNC: 4.4 MMOL/L — SIGNIFICANT CHANGE UP (ref 3.5–5.3)
POTASSIUM SERPL-SCNC: 4.4 MMOL/L — SIGNIFICANT CHANGE UP (ref 3.5–5.3)
PROT SERPL-MCNC: 7 G/DL — SIGNIFICANT CHANGE UP (ref 6–8.3)
PROT UR-MCNC: SIGNIFICANT CHANGE UP
PROTHROM AB SERPL-ACNC: 10.8 SEC — SIGNIFICANT CHANGE UP (ref 9.5–13)
RBC # BLD: 3.01 M/UL — LOW (ref 3.8–5.2)
RBC # FLD: 17.4 % — HIGH (ref 10.3–14.5)
RBC CASTS # UR COMP ASSIST: 16 /HPF — HIGH (ref 0–4)
RH IG SCN BLD-IMP: POSITIVE — SIGNIFICANT CHANGE UP
SODIUM SERPL-SCNC: 138 MMOL/L — SIGNIFICANT CHANGE UP (ref 135–145)
SP GR SPEC: 1 — LOW (ref 1.01–1.02)
UROBILINOGEN FLD QL: NEGATIVE — SIGNIFICANT CHANGE UP
WBC # BLD: 11.66 K/UL — HIGH (ref 3.8–10.5)
WBC # FLD AUTO: 11.66 K/UL — HIGH (ref 3.8–10.5)
WBC UR QL: 6 /HPF — HIGH (ref 0–5)

## 2023-08-07 PROCEDURE — 99284 EMERGENCY DEPT VISIT MOD MDM: CPT

## 2023-08-07 NOTE — ED ADULT NURSE NOTE - NSFALLUNIVINTERV_ED_ALL_ED
Bed/Stretcher in lowest position, wheels locked, appropriate side rails in place/Call bell, personal items and telephone in reach/Instruct patient to call for assistance before getting out of bed/chair/stretcher/Non-slip footwear applied when patient is off stretcher/Jay to call system/Physically safe environment - no spills, clutter or unnecessary equipment/Purposeful proactive rounding/Room/bathroom lighting operational, light cord in reach

## 2023-08-07 NOTE — ED ADULT NURSE NOTE - OBJECTIVE STATEMENT
PT is a 32 year old A&OX4 female with no significant PMH and PSH of spina bifida repair as a child who presents to the ED via EMS with c/o vaginal bleeding. Per EMS, PT delivered her first child on  at 45+ weeks gestation as an uncomplicated . PT has not had any problems until today when she felt a heavy flow into the toilet and saw a large amount of bright red blood. PT states she immediately felt SOB and dizzy, and had to lay down on the floor. Upon arrival, EMS found PT to be hypotensive to 94/60. PT states her symptoms have subsided but has concerns about the loss of blood and is unsure if she is still bleeding. PT arrived in a diaper, states she has not changed it or checked it. PT is resting comfortably in bed, breathing unlabored on room air, and speaking in complete sentences. Abdomen is soft, non-tender, and non-distended. Skin is warm and dry, no diaphoresis noted. No edema noted to B/L extremities. Strong strength in B/L extremities, sensation intact. IV access established 18G in left AC. Vital signs stable. PT ambulatory with steady gait. Safety and comfort maintained.  en route to the ED at this time. PT is a 32 year old A&OX4 female with no significant PMH and PSH of spina bifida repair as a child who presents to the ED via EMS with c/o vaginal bleeding. Per EMS, PT delivered her first child "on  at 45+ weeks gestation as an uncomplicated " per EMS. PT has not had any problems until today when she felt a heavy flow into the toilet and saw a large amount of bright red blood. PT states she immediately felt SOB and dizzy, and had to lay down on the floor. Upon arrival, EMS found PT to be hypotensive to 94/60. PT states her symptoms have subsided but has concerns about the loss of blood and is unsure if she is still bleeding. PT arrived in a diaper, states she has not changed it or checked it. PT is resting comfortably in bed, breathing unlabored on room air, and speaking in complete sentences. Abdomen is soft, non-tender, and non-distended. Skin is warm and dry, no diaphoresis noted. No edema noted to B/L extremities. Strong strength in B/L extremities, sensation intact. IV access established 18G in left AC. Vital signs stable. PT ambulatory with steady gait. Safety and comfort maintained.  en route to the ED at this time.

## 2023-08-08 ENCOUNTER — TRANSCRIPTION ENCOUNTER (OUTPATIENT)
Age: 33
End: 2023-08-08

## 2023-08-08 ENCOUNTER — APPOINTMENT (OUTPATIENT)
Age: 33
End: 2023-08-08
Payer: COMMERCIAL

## 2023-08-08 VITALS
SYSTOLIC BLOOD PRESSURE: 103 MMHG | HEART RATE: 81 BPM | OXYGEN SATURATION: 98 % | TEMPERATURE: 98 F | RESPIRATION RATE: 18 BRPM | DIASTOLIC BLOOD PRESSURE: 64 MMHG

## 2023-08-08 LAB
HCT VFR BLD CALC: 24.1 % — LOW (ref 34.5–45)
HGB BLD-MCNC: 7.5 G/DL — LOW (ref 11.5–15.5)
MCHC RBC-ENTMCNC: 27.6 PG — SIGNIFICANT CHANGE UP (ref 27–34)
MCHC RBC-ENTMCNC: 31.1 GM/DL — LOW (ref 32–36)
MCV RBC AUTO: 88.6 FL — SIGNIFICANT CHANGE UP (ref 80–100)
NRBC # BLD: 0 /100 WBCS — SIGNIFICANT CHANGE UP (ref 0–0)
PLATELET # BLD AUTO: 655 K/UL — HIGH (ref 150–400)
RBC # BLD: 2.72 M/UL — LOW (ref 3.8–5.2)
RBC # FLD: 17.4 % — HIGH (ref 10.3–14.5)
WBC # BLD: 10.74 K/UL — HIGH (ref 3.8–10.5)
WBC # FLD AUTO: 10.74 K/UL — HIGH (ref 3.8–10.5)

## 2023-08-08 PROCEDURE — 85025 COMPLETE CBC W/AUTO DIFF WBC: CPT

## 2023-08-08 PROCEDURE — 86850 RBC ANTIBODY SCREEN: CPT

## 2023-08-08 PROCEDURE — 96360 HYDRATION IV INFUSION INIT: CPT

## 2023-08-08 PROCEDURE — 80053 COMPREHEN METABOLIC PANEL: CPT

## 2023-08-08 PROCEDURE — 87086 URINE CULTURE/COLONY COUNT: CPT

## 2023-08-08 PROCEDURE — 93976 VASCULAR STUDY: CPT

## 2023-08-08 PROCEDURE — 99285 EMERGENCY DEPT VISIT HI MDM: CPT | Mod: 25

## 2023-08-08 PROCEDURE — 85027 COMPLETE CBC AUTOMATED: CPT

## 2023-08-08 PROCEDURE — 81001 URINALYSIS AUTO W/SCOPE: CPT

## 2023-08-08 PROCEDURE — 86900 BLOOD TYPING SEROLOGIC ABO: CPT

## 2023-08-08 PROCEDURE — 86901 BLOOD TYPING SEROLOGIC RH(D): CPT

## 2023-08-08 PROCEDURE — 93976 VASCULAR STUDY: CPT | Mod: 26

## 2023-08-08 PROCEDURE — 85610 PROTHROMBIN TIME: CPT

## 2023-08-08 PROCEDURE — 96361 HYDRATE IV INFUSION ADD-ON: CPT

## 2023-08-08 PROCEDURE — S9443: CPT | Mod: 95

## 2023-08-08 PROCEDURE — 85730 THROMBOPLASTIN TIME PARTIAL: CPT

## 2023-08-08 PROCEDURE — 36415 COLL VENOUS BLD VENIPUNCTURE: CPT

## 2023-08-08 PROCEDURE — 76817 TRANSVAGINAL US OBSTETRIC: CPT

## 2023-08-08 PROCEDURE — 76817 TRANSVAGINAL US OBSTETRIC: CPT | Mod: 26

## 2023-08-08 RX ORDER — SODIUM CHLORIDE 9 MG/ML
1000 INJECTION INTRAMUSCULAR; INTRAVENOUS; SUBCUTANEOUS ONCE
Refills: 0 | Status: COMPLETED | OUTPATIENT
Start: 2023-08-08 | End: 2023-08-08

## 2023-08-08 RX ADMIN — SODIUM CHLORIDE 1000 MILLILITER(S): 9 INJECTION INTRAMUSCULAR; INTRAVENOUS; SUBCUTANEOUS at 03:25

## 2023-08-08 RX ADMIN — SODIUM CHLORIDE 1000 MILLILITER(S): 9 INJECTION INTRAMUSCULAR; INTRAVENOUS; SUBCUTANEOUS at 01:22

## 2023-08-08 NOTE — ED PROVIDER NOTE - CLINICAL SUMMARY MEDICAL DECISION MAKING FREE TEXT BOX
32-year-old female history of spina bifida, status post  on  for 45 week gestation with no complications during birth, presents today for 1 episode of vaginal bleeding.  Patient states she has gone through 1 pad since 7 PM tonight.  Also endorses lightheadedness upon standing, improves when lying down.  Patient afebrile, not tachycardic, not hypotensive.  Mild conjunctival pallor, lungs clear to auscultation, surgical site clean dry and intact.  Pelvic shows active slow bleeding from cervix with minor clots. Will check for anemia, retained products of conception. Low concern for infection at this time. Will obtain labs, US, reassess.

## 2023-08-08 NOTE — ED PROVIDER NOTE - PHYSICAL EXAMINATION
GEN: NAD. A&Ox3. Non-toxic appearing.  HEENT: normocephalic, atraumatic, EOMI, PERRL, no scleral icterus, no conjuntival pallor, moist MM  CARDIAC: RRR, S1, S2, no murmur. Radial pulses pulses present and symmetric b/l  PULM: CTA B/L no wheeze, rhonchi, rales.   ABD: mild ttp over surgical site  : Chaperoned by MADI [place]. Blood in vaginal vault, active slow bleed noted form cervic  MSK: Moving all extremities, no edema. 5/5 strength and full ROM in all extremities.     NEURO: gait normal, no focal neurological deficits, CN 2-12 grossly intact  SKIN: surgical site c/d/i

## 2023-08-08 NOTE — ED PROVIDER NOTE - ATTENDING CONTRIBUTION TO CARE
Labs were ordered and independently reviewed by me.  EKG was ordered and independently reviewed by me.  Imaging was ordered and reviewed by me.      Appropriate medications for the patient's presenting complaints were ordered, and effects were reassessed.     Escalation to admission/observation was considered. ho    Will follow up on labs, therapeutics, imaging, reassess and disposition as clinically indicated.  *The above represents an initial assessment/impression. Please refer to my progress notes below for potential changes in patient clinical course*

## 2023-08-08 NOTE — ED PROVIDER NOTE - PROGRESS NOTE DETAILS
No significant drop in CBC.  Patient reports she has not had any further bleeding in the ER.  She feels well.  Results discussed.  Patient has GYN to follow-up withBebeto MCPHERSON.

## 2023-08-08 NOTE — ED PROVIDER NOTE - OBJECTIVE STATEMENT
32-year-old female history of spina bifida, status post  on  for 45 week gestation with no complications during birth, presents today for 1 episode of vaginal bleeding.  Patient states she has gone through 1 pad since 7 PM tonight.  Also endorses lightheadedness upon standing, improves when lying down.  Denies any fevers, chest pain, shortness of breath, abdominal pain, nausea, vomiting, large clots or pus.

## 2023-08-08 NOTE — ED PROVIDER NOTE - RECEIVING PHYSICIAN
Pt reports urinary retention.   Pt output 200mL  Bladder scan after urination and had 321mL.   MD notified.    Dr. Hearn

## 2023-08-08 NOTE — ED PROVIDER NOTE - PATIENT PORTAL LINK FT
You can access the FollowMyHealth Patient Portal offered by [facility] by registering at the following website: [email or web address]. By joining [facility]’s FollowMyHealth portal, you will also be able to view your health information using other applications (apps) compatible with our system.

## 2023-08-08 NOTE — ED PROVIDER NOTE - SHIFT CHANGE DETAILS
Juan Ramon Oconnor MD [facility] note of transfer at the usual time of sign out: Receiving team will follow up on labs, analgesia, any clinical imaging, reassess and disposition as clinically indicated.  Details of patient and plan conveyed to receiving physician and conveyed back for understanding.  There were no questions at this time about the patient's status, disposition, and plan. Patient's care to be taken over by receiving physician at this time, all decisions regarding the progression of care will be made at their discretion.

## 2023-08-08 NOTE — ED PROVIDER NOTE - NSFOLLOWUPINSTRUCTIONS_ED_ALL_ED_FT
You were evaluated for vaginal bleeding after pregnancy during your ER visit.   your blood work and ultrasound were reassuring.  Please follow-up with your gynecologist.  Please seek medical attention if you have an increase in bleeding, feel lightheaded, have abdominal pain, high fever, experience lightheadedness, or if you have any concerns.

## 2023-08-09 LAB
CULTURE RESULTS: SIGNIFICANT CHANGE UP
SPECIMEN SOURCE: SIGNIFICANT CHANGE UP

## 2024-01-01 NOTE — DISCHARGE NOTE OB - SEVERE ABDOMINAL/VAGINAL AND/OR RECTAL PAIN
Nipple attempt discontinued due to:          Disengagement Cue Options    Bradycardia requiring stimulation       >1 self-resolved bradycardia episode despite pacing &/or rest breaks       Continued desats (<90%) despite pacing & rest breaks       Increased WOB (head bobbing/retractions/nasal flaring/color change),  sustained tachypnea, or emerging stridor despite pacing or rest breaks       Increased oxygen requirements       Loss of SSB coordination (loss of liquid from front of mouth/gulping/breath    holding)     x  Lack of active suck bursts/loss of motor tone/flat affect     x  Fatigues with progression of nipple attempt     Reflux/resistive behaviors          Statement Selected

## 2024-11-11 NOTE — OB RN DELIVERY SUMMARY - NS_SEPSISRSKCALC_OBGYN_ALL_OB_FT
IV intact
EOS calculated successfully. EOS Risk Factor: 0.5/1000 live births ([facility] [place] incidence); GA=40w6d; Temp=98.24; ROM=6.95; GBS='Negative'; Antibiotics='No antibiotics or any antibiotics < 2 hrs prior to birth'

## 2025-02-03 NOTE — ED ADULT NURSE NOTE - SUICIDE SCREENING DEPRESSION
Caller: Emerita Matthews    Relationship to patient: Self    Best call back number: [phone] (home)     Chief complaint: R/S SCOPE    Type of visit: PROCEDURE    Requested date: ANYTIME EXCEPT WEDS    If rescheduling, when is the original appointment: 04/02/25     Additional notes: PT IS NEEDING TO R/S SCOPE. PLEASE CALL PT BACK         
Negative